# Patient Record
Sex: MALE | Race: BLACK OR AFRICAN AMERICAN
[De-identification: names, ages, dates, MRNs, and addresses within clinical notes are randomized per-mention and may not be internally consistent; named-entity substitution may affect disease eponyms.]

---

## 2017-05-06 ENCOUNTER — HOSPITAL ENCOUNTER (INPATIENT)
Dept: HOSPITAL 8 - ED | Age: 77
LOS: 1 days | Discharge: HOME | DRG: 682 | End: 2017-05-07
Attending: INTERNAL MEDICINE | Admitting: INTERNAL MEDICINE
Payer: COMMERCIAL

## 2017-05-06 VITALS — WEIGHT: 196.87 LBS | HEIGHT: 68 IN | BODY MASS INDEX: 29.84 KG/M2

## 2017-05-06 VITALS — DIASTOLIC BLOOD PRESSURE: 81 MMHG | SYSTOLIC BLOOD PRESSURE: 130 MMHG

## 2017-05-06 VITALS — SYSTOLIC BLOOD PRESSURE: 110 MMHG | DIASTOLIC BLOOD PRESSURE: 68 MMHG

## 2017-05-06 DIAGNOSIS — E87.5: ICD-10-CM

## 2017-05-06 DIAGNOSIS — N18.4: ICD-10-CM

## 2017-05-06 DIAGNOSIS — I12.9: ICD-10-CM

## 2017-05-06 DIAGNOSIS — Z83.3: ICD-10-CM

## 2017-05-06 DIAGNOSIS — Z59.0: ICD-10-CM

## 2017-05-06 DIAGNOSIS — E11.22: ICD-10-CM

## 2017-05-06 DIAGNOSIS — J18.9: ICD-10-CM

## 2017-05-06 DIAGNOSIS — Z79.899: ICD-10-CM

## 2017-05-06 DIAGNOSIS — N17.0: Primary | ICD-10-CM

## 2017-05-06 DIAGNOSIS — Z88.0: ICD-10-CM

## 2017-05-06 DIAGNOSIS — F10.10: ICD-10-CM

## 2017-05-06 DIAGNOSIS — Z79.82: ICD-10-CM

## 2017-05-06 DIAGNOSIS — K92.2: ICD-10-CM

## 2017-05-06 LAB
AST SERPL-CCNC: 28 U/L (ref 15–37)
BUN SERPL-MCNC: 36 MG/DL (ref 7–18)
BUN SERPL-MCNC: 37 MG/DL (ref 7–18)
PATH.CAST-FLAG: (no result)
SPERM-FLAG: (no result)
SRC-FLAG: (no result)
XTAL-FLAG: (no result)
YLC-FLAG: (no result)

## 2017-05-06 PROCEDURE — 85018 HEMOGLOBIN: CPT

## 2017-05-06 PROCEDURE — 36415 COLL VENOUS BLD VENIPUNCTURE: CPT

## 2017-05-06 PROCEDURE — 96375 TX/PRO/DX INJ NEW DRUG ADDON: CPT

## 2017-05-06 PROCEDURE — 83735 ASSAY OF MAGNESIUM: CPT

## 2017-05-06 PROCEDURE — 82140 ASSAY OF AMMONIA: CPT

## 2017-05-06 PROCEDURE — 84132 ASSAY OF SERUM POTASSIUM: CPT

## 2017-05-06 PROCEDURE — 96374 THER/PROPH/DIAG INJ IV PUSH: CPT

## 2017-05-06 PROCEDURE — 84100 ASSAY OF PHOSPHORUS: CPT

## 2017-05-06 PROCEDURE — 81001 URINALYSIS AUTO W/SCOPE: CPT

## 2017-05-06 PROCEDURE — 80061 LIPID PANEL: CPT

## 2017-05-06 PROCEDURE — 80048 BASIC METABOLIC PNL TOTAL CA: CPT

## 2017-05-06 PROCEDURE — 93005 ELECTROCARDIOGRAM TRACING: CPT

## 2017-05-06 PROCEDURE — 85025 COMPLETE CBC W/AUTO DIFF WBC: CPT

## 2017-05-06 PROCEDURE — 83036 HEMOGLOBIN GLYCOSYLATED A1C: CPT

## 2017-05-06 PROCEDURE — 84443 ASSAY THYROID STIM HORMONE: CPT

## 2017-05-06 PROCEDURE — 87324 CLOSTRIDIUM AG IA: CPT

## 2017-05-06 PROCEDURE — 80053 COMPREHEN METABOLIC PANEL: CPT

## 2017-05-06 PROCEDURE — 90732 PPSV23 VACC 2 YRS+ SUBQ/IM: CPT

## 2017-05-06 PROCEDURE — 82962 GLUCOSE BLOOD TEST: CPT

## 2017-05-06 PROCEDURE — 83605 ASSAY OF LACTIC ACID: CPT

## 2017-05-06 PROCEDURE — C9113 INJ PANTOPRAZOLE SODIUM, VIA: HCPCS

## 2017-05-06 PROCEDURE — 87040 BLOOD CULTURE FOR BACTERIA: CPT

## 2017-05-06 PROCEDURE — 96361 HYDRATE IV INFUSION ADD-ON: CPT

## 2017-05-06 PROCEDURE — 85014 HEMATOCRIT: CPT

## 2017-05-06 PROCEDURE — 71010: CPT

## 2017-05-06 PROCEDURE — 83690 ASSAY OF LIPASE: CPT

## 2017-05-06 PROCEDURE — 85610 PROTHROMBIN TIME: CPT

## 2017-05-06 PROCEDURE — 84145 PROCALCITONIN (PCT): CPT

## 2017-05-06 PROCEDURE — 85730 THROMBOPLASTIN TIME PARTIAL: CPT

## 2017-05-06 RX ADMIN — CEFTRIAXONE SCH MLS/HR: 1 INJECTION, SOLUTION INTRAVENOUS at 16:47

## 2017-05-06 RX ADMIN — PANTOPRAZOLE SODIUM SCH MG: 40 INJECTION, POWDER, FOR SOLUTION INTRAVENOUS at 22:48

## 2017-05-06 RX ADMIN — AZITHROMYCIN SCH MG: 500 TABLET, FILM COATED ORAL at 18:22

## 2017-05-06 RX ADMIN — GUAIFENESIN SCH MG: 200 TABLET ORAL at 16:47

## 2017-05-06 RX ADMIN — PANTOPRAZOLE SODIUM SCH MG: 40 INJECTION, POWDER, FOR SOLUTION INTRAVENOUS at 16:47

## 2017-05-06 RX ADMIN — INSULIN HUMAN SCH UNITS: 100 INJECTION, SOLUTION PARENTERAL at 16:00

## 2017-05-06 RX ADMIN — GUAIFENESIN SCH MG: 200 TABLET ORAL at 22:48

## 2017-05-06 RX ADMIN — INSULIN HUMAN SCH UNITS: 100 INJECTION, SOLUTION PARENTERAL at 21:00

## 2017-05-06 SDOH — ECONOMIC STABILITY - HOUSING INSECURITY: HOMELESSNESS: Z59.0

## 2017-05-07 VITALS — DIASTOLIC BLOOD PRESSURE: 78 MMHG | SYSTOLIC BLOOD PRESSURE: 129 MMHG

## 2017-05-07 VITALS — SYSTOLIC BLOOD PRESSURE: 118 MMHG | DIASTOLIC BLOOD PRESSURE: 71 MMHG

## 2017-05-07 VITALS — DIASTOLIC BLOOD PRESSURE: 66 MMHG | SYSTOLIC BLOOD PRESSURE: 109 MMHG

## 2017-05-07 LAB — BUN SERPL-MCNC: 32 MG/DL (ref 7–18)

## 2017-05-07 RX ADMIN — GUAIFENESIN SCH MG: 200 TABLET ORAL at 05:50

## 2017-05-07 RX ADMIN — CEFTRIAXONE SCH MLS/HR: 1 INJECTION, SOLUTION INTRAVENOUS at 13:30

## 2017-05-07 RX ADMIN — INSULIN HUMAN SCH UNITS: 100 INJECTION, SOLUTION PARENTERAL at 16:00

## 2017-05-07 RX ADMIN — INSULIN HUMAN SCH UNITS: 100 INJECTION, SOLUTION PARENTERAL at 11:00

## 2017-05-07 RX ADMIN — INSULIN HUMAN SCH UNITS: 100 INJECTION, SOLUTION PARENTERAL at 07:00

## 2017-05-07 RX ADMIN — PANTOPRAZOLE SODIUM SCH MG: 40 INJECTION, POWDER, FOR SOLUTION INTRAVENOUS at 10:52

## 2017-05-07 RX ADMIN — AZITHROMYCIN SCH MG: 500 TABLET, FILM COATED ORAL at 10:53

## 2017-05-07 RX ADMIN — GUAIFENESIN SCH MG: 200 TABLET ORAL at 16:02

## 2020-11-07 ENCOUNTER — APPOINTMENT (OUTPATIENT)
Dept: RADIOLOGY | Facility: MEDICAL CENTER | Age: 80
End: 2020-11-07
Attending: EMERGENCY MEDICINE
Payer: MEDICARE

## 2020-11-07 ENCOUNTER — HOSPITAL ENCOUNTER (EMERGENCY)
Facility: MEDICAL CENTER | Age: 80
End: 2020-11-07
Attending: EMERGENCY MEDICINE
Payer: MEDICARE

## 2020-11-07 VITALS
SYSTOLIC BLOOD PRESSURE: 198 MMHG | BODY MASS INDEX: 27.34 KG/M2 | HEIGHT: 67 IN | RESPIRATION RATE: 14 BRPM | HEART RATE: 78 BPM | WEIGHT: 174.16 LBS | DIASTOLIC BLOOD PRESSURE: 90 MMHG | OXYGEN SATURATION: 95 % | TEMPERATURE: 98.1 F

## 2020-11-07 DIAGNOSIS — K59.00 CONSTIPATION, UNSPECIFIED CONSTIPATION TYPE: ICD-10-CM

## 2020-11-07 DIAGNOSIS — I10 HYPERTENSION, UNSPECIFIED TYPE: ICD-10-CM

## 2020-11-07 DIAGNOSIS — R11.2 NAUSEA AND VOMITING, INTRACTABILITY OF VOMITING NOT SPECIFIED, UNSPECIFIED VOMITING TYPE: ICD-10-CM

## 2020-11-07 LAB
ALBUMIN SERPL BCP-MCNC: 4.6 G/DL (ref 3.2–4.9)
ALBUMIN/GLOB SERPL: 1.2 G/DL
ALP SERPL-CCNC: 85 U/L (ref 30–99)
ALT SERPL-CCNC: 11 U/L (ref 2–50)
ANION GAP SERPL CALC-SCNC: 15 MMOL/L (ref 7–16)
AST SERPL-CCNC: 23 U/L (ref 12–45)
BASOPHILS # BLD AUTO: 1 % (ref 0–1.8)
BASOPHILS # BLD: 0.05 K/UL (ref 0–0.12)
BILIRUB SERPL-MCNC: 0.4 MG/DL (ref 0.1–1.5)
BUN SERPL-MCNC: 22 MG/DL (ref 8–22)
CALCIUM SERPL-MCNC: 9.7 MG/DL (ref 8.5–10.5)
CHLORIDE SERPL-SCNC: 97 MMOL/L (ref 96–112)
CO2 SERPL-SCNC: 24 MMOL/L (ref 20–33)
CREAT SERPL-MCNC: 1.76 MG/DL (ref 0.5–1.4)
EOSINOPHIL # BLD AUTO: 0.36 K/UL (ref 0–0.51)
EOSINOPHIL NFR BLD: 7.3 % (ref 0–6.9)
ERYTHROCYTE [DISTWIDTH] IN BLOOD BY AUTOMATED COUNT: 49.9 FL (ref 35.9–50)
GLOBULIN SER CALC-MCNC: 3.9 G/DL (ref 1.9–3.5)
GLUCOSE SERPL-MCNC: 81 MG/DL (ref 65–99)
HCT VFR BLD AUTO: 45 % (ref 42–52)
HGB BLD-MCNC: 14.7 G/DL (ref 14–18)
IMM GRANULOCYTES # BLD AUTO: 0.01 K/UL (ref 0–0.11)
IMM GRANULOCYTES NFR BLD AUTO: 0.2 % (ref 0–0.9)
LYMPHOCYTES # BLD AUTO: 1.67 K/UL (ref 1–4.8)
LYMPHOCYTES NFR BLD: 33.7 % (ref 22–41)
MCH RBC QN AUTO: 32 PG (ref 27–33)
MCHC RBC AUTO-ENTMCNC: 32.7 G/DL (ref 33.7–35.3)
MCV RBC AUTO: 98 FL (ref 81.4–97.8)
MONOCYTES # BLD AUTO: 0.42 K/UL (ref 0–0.85)
MONOCYTES NFR BLD AUTO: 8.5 % (ref 0–13.4)
NEUTROPHILS # BLD AUTO: 2.45 K/UL (ref 1.82–7.42)
NEUTROPHILS NFR BLD: 49.3 % (ref 44–72)
NRBC # BLD AUTO: 0 K/UL
NRBC BLD-RTO: 0 /100 WBC
PLATELET # BLD AUTO: 249 K/UL (ref 164–446)
PMV BLD AUTO: 10.6 FL (ref 9–12.9)
POTASSIUM SERPL-SCNC: 4.7 MMOL/L (ref 3.6–5.5)
PROT SERPL-MCNC: 8.5 G/DL (ref 6–8.2)
RBC # BLD AUTO: 4.59 M/UL (ref 4.7–6.1)
SODIUM SERPL-SCNC: 136 MMOL/L (ref 135–145)
WBC # BLD AUTO: 5 K/UL (ref 4.8–10.8)

## 2020-11-07 PROCEDURE — 700111 HCHG RX REV CODE 636 W/ 250 OVERRIDE (IP): Performed by: EMERGENCY MEDICINE

## 2020-11-07 PROCEDURE — 96374 THER/PROPH/DIAG INJ IV PUSH: CPT

## 2020-11-07 PROCEDURE — A9270 NON-COVERED ITEM OR SERVICE: HCPCS | Performed by: EMERGENCY MEDICINE

## 2020-11-07 PROCEDURE — 700102 HCHG RX REV CODE 250 W/ 637 OVERRIDE(OP): Performed by: EMERGENCY MEDICINE

## 2020-11-07 PROCEDURE — 99284 EMERGENCY DEPT VISIT MOD MDM: CPT

## 2020-11-07 PROCEDURE — 74019 RADEX ABDOMEN 2 VIEWS: CPT

## 2020-11-07 PROCEDURE — 80053 COMPREHEN METABOLIC PANEL: CPT

## 2020-11-07 PROCEDURE — 85025 COMPLETE CBC W/AUTO DIFF WBC: CPT

## 2020-11-07 RX ORDER — AMLODIPINE BESYLATE 5 MG/1
5 TABLET ORAL ONCE
Status: COMPLETED | OUTPATIENT
Start: 2020-11-07 | End: 2020-11-07

## 2020-11-07 RX ORDER — AMLODIPINE BESYLATE 5 MG/1
5 TABLET ORAL DAILY
Qty: 30 TAB | Refills: 0 | Status: SHIPPED | OUTPATIENT
Start: 2020-11-07 | End: 2021-06-20 | Stop reason: SDUPTHER

## 2020-11-07 RX ORDER — DOCUSATE SODIUM 100 MG/1
100 CAPSULE, LIQUID FILLED ORAL ONCE
Status: COMPLETED | OUTPATIENT
Start: 2020-11-07 | End: 2020-11-07

## 2020-11-07 RX ORDER — ONDANSETRON 2 MG/ML
4 INJECTION INTRAMUSCULAR; INTRAVENOUS ONCE
Status: COMPLETED | OUTPATIENT
Start: 2020-11-07 | End: 2020-11-07

## 2020-11-07 RX ORDER — ONDANSETRON 4 MG/1
4 TABLET, ORALLY DISINTEGRATING ORAL EVERY 6 HOURS PRN
Qty: 16 TAB | Refills: 0 | Status: SHIPPED | OUTPATIENT
Start: 2020-11-07 | End: 2020-11-11

## 2020-11-07 RX ADMIN — AMLODIPINE BESYLATE 5 MG: 5 TABLET ORAL at 04:38

## 2020-11-07 RX ADMIN — DOCUSATE SODIUM 100 MG: 100 CAPSULE ORAL at 04:37

## 2020-11-07 RX ADMIN — ONDANSETRON 4 MG: 2 INJECTION INTRAMUSCULAR; INTRAVENOUS at 03:47

## 2020-11-07 SDOH — HEALTH STABILITY: MENTAL HEALTH: HOW OFTEN DO YOU HAVE A DRINK CONTAINING ALCOHOL?: NEVER

## 2020-11-07 ASSESSMENT — ENCOUNTER SYMPTOMS
CHILLS: 0
CONSTIPATION: 1
NAUSEA: 1
FEVER: 0
VOMITING: 1
DIARRHEA: 0
ABDOMINAL PAIN: 0
SHORTNESS OF BREATH: 0
COUGH: 0
SORE THROAT: 0
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
## 2020-12-19 ENCOUNTER — HOSPITAL ENCOUNTER (EMERGENCY)
Dept: HOSPITAL 8 - ED | Age: 80
Discharge: HOME | End: 2020-12-19
Payer: SELF-PAY

## 2020-12-19 VITALS — SYSTOLIC BLOOD PRESSURE: 163 MMHG | DIASTOLIC BLOOD PRESSURE: 85 MMHG

## 2020-12-19 VITALS — HEIGHT: 69 IN | WEIGHT: 154.32 LBS | BODY MASS INDEX: 22.86 KG/M2

## 2020-12-19 DIAGNOSIS — E11.40: Primary | ICD-10-CM

## 2020-12-19 DIAGNOSIS — M79.671: ICD-10-CM

## 2020-12-19 DIAGNOSIS — M79.672: ICD-10-CM

## 2020-12-19 PROCEDURE — 99285 EMERGENCY DEPT VISIT HI MDM: CPT

## 2020-12-19 NOTE — NUR
1 TRIAGE RN NOTE: NO ANSWER WHEN CALLED FROM [employer] 

-------------------------------------------------------------------------------

Addendum: 12/19/20 at 1436 by JW

-------------------------------------------------------------------------------

 TRIAGE RN NOTE: NO ANSWER WHEN CALLED FROM [facility] 1

## 2020-12-19 NOTE — NUR
CHARGE RN: PT TO ROOM FROM [facility]

-------------------------------------------------------------------------------

Addendum: 12/19/20 at 1716 by ADI

-------------------------------------------------------------------------------

PT TO ROOM VIA W/C

## 2020-12-19 NOTE — NUR
PT HAS PLAN TO LEAVE HOSPITAL AND WALK TO [facility]. PT OFFERED [facility] VOUCHER AND 
PT HAS REFUSED MULTIPLE TIMES. PT STATES HE WILL GET A BUS PASS TOMORROW TO 
HEAD BACK TO [place]. PT GIVEN DC INSTRUCTIONS AND VERBALIZES UNDERSTANDING. 
PT HAS SIGNED NARCOTIC AGREEMENT AND VERBALIZES UNDERSTANDING OF THAT AS WELL. 
PT STEADY ON FEET

## 2020-12-19 NOTE — NUR
PT C/O BILATERAL LEG PAIN FOR 3 DAYS. PT STATES IT IS HIS NEUROPATHY AND HE HAS 
FELT THIS BEFORE. PT ALSO STATES HE HAS NOT HAD AN APPETITE FOR 3 DAYS. PT IS 
ALSO CONCERNED ABOUT HOW HE IS GOING TO GET BACK TO [place] AND IS ASKING ABOUT 
HOW TO GET A RIDE BACK.

## 2021-01-02 ENCOUNTER — HOSPITAL ENCOUNTER (EMERGENCY)
Dept: HOSPITAL 8 - ED | Age: 81
Discharge: HOME | End: 2021-01-02
Payer: SELF-PAY

## 2021-01-02 VITALS — WEIGHT: 180.78 LBS | HEIGHT: 68 IN | BODY MASS INDEX: 27.4 KG/M2

## 2021-01-02 VITALS — SYSTOLIC BLOOD PRESSURE: 142 MMHG | DIASTOLIC BLOOD PRESSURE: 73 MMHG
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
## 2021-01-02 DIAGNOSIS — T78.49XA: ICD-10-CM

## 2021-01-02 DIAGNOSIS — Z87.891: ICD-10-CM
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
## 2021-01-02 DIAGNOSIS — L29.9: ICD-10-CM

## 2021-01-02 DIAGNOSIS — S40.862A: Primary | ICD-10-CM
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
## 2021-01-02 DIAGNOSIS — E11.9: ICD-10-CM

## 2021-01-02 DIAGNOSIS — I10: ICD-10-CM

## 2021-01-02 DIAGNOSIS — S40.861A: ICD-10-CM
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
## 2021-01-02 PROCEDURE — 99283 EMERGENCY DEPT VISIT LOW MDM: CPT
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
## 2021-01-04 ENCOUNTER — HOSPITAL ENCOUNTER (EMERGENCY)
Facility: MEDICAL CENTER | Age: 81
End: 2021-01-04
Attending: EMERGENCY MEDICINE
Payer: MEDICARE

## 2021-01-04 VITALS
TEMPERATURE: 98.3 F | BODY MASS INDEX: 26.06 KG/M2 | WEIGHT: 171.96 LBS | SYSTOLIC BLOOD PRESSURE: 137 MMHG | DIASTOLIC BLOOD PRESSURE: 85 MMHG | HEIGHT: 68 IN | OXYGEN SATURATION: 97 % | RESPIRATION RATE: 16 BRPM | HEART RATE: 88 BPM

## 2021-01-04 DIAGNOSIS — R21 RASH: ICD-10-CM

## 2021-01-04 PROCEDURE — A9270 NON-COVERED ITEM OR SERVICE: HCPCS | Performed by: EMERGENCY MEDICINE

## 2021-01-04 PROCEDURE — 99283 EMERGENCY DEPT VISIT LOW MDM: CPT

## 2021-01-04 PROCEDURE — 700102 HCHG RX REV CODE 250 W/ 637 OVERRIDE(OP): Performed by: EMERGENCY MEDICINE

## 2021-01-04 RX ORDER — DIPHENHYDRAMINE HCL 25 MG
25 TABLET ORAL ONCE
Status: COMPLETED | OUTPATIENT
Start: 2021-01-04 | End: 2021-01-04

## 2021-01-04 RX ADMIN — DIPHENHYDRAMINE HYDROCHLORIDE 25 MG: 25 TABLET ORAL at 15:58

## 2021-01-04 ASSESSMENT — FIBROSIS 4 INDEX: FIB4 SCORE: 2.17

## 2021-01-04 NOTE — ED TRIAGE NOTES
"Chief Complaint   Patient presents with   • Itching     \"all over my belly to my butt crack\"     Pt ambulatory to triage for above complaint. Pt isn't sure if he has a rash but just that he is very itchy. Pt resides in the shelter. When asked if he noticed any bugs he denies any. Pt reports he stopped taking his flomax in case that is what's causing the itching.     /87   Pulse 90   Temp 36.9 °C (98.4 °F) (Oral)   Resp 16   Ht 1.727 m (5' 8\")   Wt 78 kg (171 lb 15.3 oz)   SpO2 95%   BMI 26.15 kg/m²     "

## 2021-01-05 NOTE — ED PROVIDER NOTES
"ED Provider Note    CHIEF COMPLAINT  Chief Complaint   Patient presents with   • Itching     \"all over my belly to my butt crack\"       HPI  Ángel Jerry is a 80 y.o. male who presents the patient presents to the emergency department complaining of a rash.  He states it is itchy and uncomfortable.  He states is primarily over his upper back and shoulders.  Told the nursing staff that the rash was all over his legs and belly.  This area he states have resolved now with itching over his shoulder.    He was recently at [place] and started on Zofran and Flomax.  He is concerned this might be causing a rash.  He denies any new soaps or lotions or other contacts.  Denies any other acute concerns or complaints.  Symptoms are bilateral and intensely pruritic.    Denies STD exposure or sexual activity.    REVIEW OF SYSTEMS  See HPI for further details. All other systems are negative.    PAST MEDICAL HISTORY  Past Medical History:   Diagnosis Date   • Diabetes (HCC)    • Hypertension        FAMILY HISTORY  History reviewed. No pertinent family history.    SOCIAL HISTORY  Social History     Socioeconomic History   • Marital status: Single     Spouse name: Not on file   • Number of children: Not on file   • Years of education: Not on file   • Highest education level: Not on file   Occupational History   • Not on file   Social Needs   • Financial resource strain: Not on file   • Food insecurity     Worry: Not on file     Inability: Not on file   • Transportation needs     Medical: Not on file     Non-medical: Not on file   Tobacco Use   • Smoking status: Never Smoker   • Smokeless tobacco: Never Used   Substance and Sexual Activity   • Alcohol use: Never     Frequency: Never   • Drug use: Never   • Sexual activity: Not on file   Lifestyle   • Physical activity     Days per week: Not on file     Minutes per session: Not on file   • Stress: Not on file   Relationships   • Social connections     Talks on phone: Not on file     " "Gets together: Not on file     Attends Sabianism service: Not on file     Active member of club or organization: Not on file     Attends meetings of clubs or organizations: Not on file     Relationship status: Not on file   • Intimate partner violence     Fear of current or ex partner: Not on file     Emotionally abused: Not on file     Physically abused: Not on file     Forced sexual activity: Not on file   Other Topics Concern   • Not on file   Social History Narrative   • Not on file       SURGICAL HISTORY  History reviewed. No pertinent surgical history.    CURRENT MEDICATIONS  Home Medications     Reviewed by Florencia Galarza R.N. (Registered Nurse) on 01/04/21 at 1445  Med List Status: Partial   Medication Last Dose Status   amLODIPine (NORVASC) 5 MG Tab  Active   metFORMIN (GLUCOPHAGE) 500 MG Tab  Active                ALLERGIES  Allergies   Allergen Reactions   • Penicillins        PHYSICAL EXAM  VITAL SIGNS: /87   Pulse 90   Temp 36.9 °C (98.4 °F) (Oral)   Resp 16   Ht 1.727 m (5' 8\")   Wt 78 kg (171 lb 15.3 oz)   SpO2 95%   BMI 26.15 kg/m²    Constitutional: Awake nontoxic no acute distress  HENT: Normocephalic, Atraumatic, Bilateral external ears normal,  Eyes: PERRL, EOMI, Conjunctiva normal, No discharge.   Neck: Normal range of motion,  Cardiovascular: Normal heart rate, Normal rhythm, No murmurs,  Thorax & Lungs: Normal breath sounds, No respiratory distress,  Abdomen: Bowel sounds normal, Soft, No tenderness,   Skin: Warm, Dry, no erythema.  He has raised erythematous lesions on the back of his neck and on the shoulders bilaterally.  Extends slightly down his back.  Some of these lesions are confluent.  There is no petechiae, there is no vesicles there is no purpura    There is no lesions on his lower back or buttocks or palms.  Musculoskeletal: Good range of motion in all major joints.   Neurologic: Alert, No focal deficits noted.   Psychiatric: Affect anxious      COURSE & MEDICAL " DECISION MAKING  Pertinent Labs & Imaging studies reviewed. (See chart for details)    The patient has a rash that looks to be like an irritant dermatitis or urticaria.  This is not shingles this is not infectious this is not a bacterial or viral illness or folliculitis.  Does not appear to be a drug eruption.    The patient does not have a history of suggest this is a syphilitic type rash.  There is no exposure does not have the characteristic appearance.    Patient was given oral Benadryl and observed.  The itching has improved.  Despite his age he tolerated 25 mg tablets of Benadryl fairly well.  He has medicines that recently prescribed to the hospital.  I filled a bottle of Benadryl.  Is not taking this because he was concerned that one of the medications he was prescribed is causing this rash.  He does not appear to be scabietic in nature.  He does not have the right pattern.    At this point this does not appear to be of a life-threatening rash or a rash that is associated with systemic pathology.  He does have chronic renal insufficiency but it does not appear to be a uremic rash.  Is not febrile ill or toxic.  I do not have any blood tests or further work-up indicated    He is discharged to go back to the shelter he is advised that the Benadryl he has.  Can return for any new symptoms or complaints.  Questions answered agreeable to plan and discharged in good condition.    He is referred to the [facility].    [address] [address] [phone]  Schedule an appointment as soon as possible for a visit in 2 days          FINAL IMPRESSION  1. Rash         2.   3.         Electronically signed by: Luis Antonio Duran M.D., 1/4/2021 4:31 PM

## 2021-01-05 NOTE — DISCHARGE PLANNING
SW assisted Pt. With a cab voucher to get back to shelter.   Cab voucher [phone].    Cab company was contacted and it will be 60-90 minutes.     SW updated RN to notify Pt it would be a long wait.

## 2021-01-05 NOTE — DISCHARGE INSTRUCTIONS
Please take the medicine you were prescribed, diphenhydramine.  We will identified this bottle for you.    Take 1 tablet every 8 hours.    Return for worsening rash, redness, fever, or other concerns.

## 2021-01-05 NOTE — ED NOTES
Pt given discharge instructions/explained to pt how to take own medication and provided instruction/ home care instructions explained, pt verbalized understanding of instructions given/pt understands the importance of follow up, pt ambulatory to [facility] called for pt.

## 2021-01-08 ENCOUNTER — HOSPITAL ENCOUNTER (EMERGENCY)
Facility: MEDICAL CENTER | Age: 81
End: 2021-01-08
Attending: EMERGENCY MEDICINE
Payer: MEDICARE

## 2021-01-08 VITALS
BODY MASS INDEX: 25.5 KG/M2 | WEIGHT: 172.18 LBS | TEMPERATURE: 97.3 F | DIASTOLIC BLOOD PRESSURE: 71 MMHG | HEART RATE: 76 BPM | HEIGHT: 69 IN | SYSTOLIC BLOOD PRESSURE: 130 MMHG | RESPIRATION RATE: 16 BRPM | OXYGEN SATURATION: 97 %

## 2021-01-08 DIAGNOSIS — L29.9 PRURITUS: ICD-10-CM

## 2021-01-08 DIAGNOSIS — R21 RASH: ICD-10-CM

## 2021-01-08 PROCEDURE — A9270 NON-COVERED ITEM OR SERVICE: HCPCS | Performed by: EMERGENCY MEDICINE

## 2021-01-08 PROCEDURE — 99283 EMERGENCY DEPT VISIT LOW MDM: CPT

## 2021-01-08 PROCEDURE — 700102 HCHG RX REV CODE 250 W/ 637 OVERRIDE(OP): Performed by: EMERGENCY MEDICINE

## 2021-01-08 RX ORDER — HYDROXYZINE HYDROCHLORIDE 25 MG/1
25 TABLET, FILM COATED ORAL ONCE
Status: COMPLETED | OUTPATIENT
Start: 2021-01-08 | End: 2021-01-08

## 2021-01-08 RX ORDER — HYDROXYZINE HYDROCHLORIDE 25 MG/1
25 TABLET, FILM COATED ORAL 3 TIMES DAILY PRN
Qty: 10 TAB | Refills: 0 | Status: SHIPPED | OUTPATIENT
Start: 2021-01-08 | End: 2021-04-05 | Stop reason: SDUPTHER

## 2021-01-08 RX ADMIN — HYDROXYZINE HYDROCHLORIDE 25 MG: 25 TABLET, FILM COATED ORAL at 10:31

## 2021-01-08 ASSESSMENT — FIBROSIS 4 INDEX: FIB4 SCORE: 2.17

## 2021-01-08 NOTE — DISCHARGE INSTRUCTIONS
Follow-up with primary care next week for reevaluation, to establish care, for medication management close blood pressure monitoring.    Try hydroxyzine 3 times daily (every 8 hours) as needed for itching.  Do NOT take Benadryl while also taking this medication.    Consider that this may be bedbugs.  As available, wash all bedding, close in scalding hot water.  Keep sleeping area clean.    Return to the emergency department for intractable itching, worsening rash, swelling, fever, difficulty breathing or swallowing or other new concerns.

## 2021-01-08 NOTE — DISCHARGE PLANNING
note:  Tech asked CM if there are options for pt other than going back to the shelter. Information given for [facility] which has rooms for rent for $400 a month however, pt would need to qualify. Discussed with [facility] also. SW has offered to help as needed. Tech will give team updates.

## 2021-01-08 NOTE — ED TRIAGE NOTES
".  Chief Complaint   Patient presents with   • Itchy     \"all over\"   • Weakness     generalized     ./96   Pulse (!) 108   Temp 36.3 °C (97.3 °F) (Temporal)   Resp 18   Ht 1.74 m (5' 8.5\")   Wt 78.1 kg (172 lb 2.9 oz)   SpO2 94%   BMI 25.80 kg/m²     Ambulatory to triage with above complaints, seen here 1/4 for same, educated on triage process, placed in lobby, told to inform staff of any changes in condition.    "

## 2021-01-09 NOTE — ED PROVIDER NOTES
"ED Provider Note    CHIEF COMPLAINT  Chief Complaint   Patient presents with   • Itchy     \"all over\"   • Weakness     generalized       HPI  Ángel Jerry is a 80 y.o. male who presents to the emergency department through triage for rash and itching.  Patient describes recurrent rash intermittent for a couple of months.  Seen and treated with Benadryl previously but states this has been ineffective.  Denies any other new medications, supplements or vitamins.  Denies new soaps, lotions or detergents.  Denies new food, fruits or juices.  However, patient is homeless and staying at the shelter.    Patient noticed recurrent rash to his upper back and upper extremities 2 days ago.  Pruritus is intractable.  Unable to sleep last night.  Denies fever or chills.  Denies blisters, vesicles, purulent drainage.    REVIEW OF SYSTEMS  See HPI for further details. All other systems are negative.     PAST MEDICAL HISTORY   has a past medical history of Diabetes (HCC) and Hypertension.    SOCIAL HISTORY  Social History     Tobacco Use   • Smoking status: Never Smoker   • Smokeless tobacco: Never Used   Substance and Sexual Activity   • Alcohol use: Never     Frequency: Never   • Drug use: Never   • Sexual activity: Not on file       SURGICAL HISTORY  patient denies any surgical history    CURRENT MEDICATIONS  Home Medications     Reviewed by Yaz Roberts R.N. (Registered Nurse) on 01/08/21 at 0849  Med List Status: Partial   Medication Last Dose Status   amLODIPine (NORVASC) 5 MG Tab  Active   metFORMIN (GLUCOPHAGE) 500 MG Tab  Active                ALLERGIES  Allergies   Allergen Reactions   • Penicillins        PHYSICAL EXAM  VITAL SIGNS: /71   Pulse 76   Temp 36.3 °C (97.3 °F) (Temporal)   Resp 16   Ht 1.74 m (5' 8.5\")   Wt 78.1 kg (172 lb 2.9 oz)   SpO2 97%   BMI 25.80 kg/m²   Pulse ox interpretation: I interpret this pulse ox as normal.  Constitutional: Alert in no apparent distress.  HENT: Normocephalic, " atraumatic. Bilateral external ears normal, Nose normal. Moist mucous membranes.    Eyes: Pupils are equal and reactive, Conjunctiva normal.   Neck: Normal range of motion  Lymphatic: No lymphadenopathy noted.   Cardiovascular: Normal peripheral perfusion.  Thorax & Lungs: Nonlabored respirations.  Skin: Warm, Dry.  Macular papular rash across upper back, left upper extremity.  No vesicles, induration, fluctuance or crepitus.  Musculoskeletal: Good range of motion in all major joints.   Neurologic: Alert and oriented x4.  Ambulates independently.  Psychiatric: Affect normal, Judgment normal, Mood normal.       COURSE & MEDICAL DECISION MAKING  Nursing notes and vital signs were reviewed. (See chart for details)  The patients records were reviewed, history was obtained from the patient;     ED evaluation for recurrent rash is unrevealing.  This does not appear to be infectious, this is not zoster.  There is no abscess or cellulitis.  Cannot exclude contact dermatitis versus bedbugs.  No clinical evidence for scabies.  Given hydroxyzine without much relief but will continue this instead of Benadryl.  Topical Benadryl encouraged as well.  Recommendations given for bedbugs although this is difficult as he is in a shelter.  Other placement opportunities have been discussed with .    Patient is stable for discharge at this time, anticipatory guidance provided, hydroxyzine for pruritus, close follow-up is encouraged, and strict ED return instructions have been detailed. Patient is agreeable to the disposition and plan.    Patient's blood pressure was elevated in the emergency department, and has been referred to primary care for close monitoring.    FINAL IMPRESSION  (R21) Rash  (L29.9) Pruritus      Electronically signed by: Marixa Peterson D.O., 1/8/2021 5:07 PM      This dictation was created using voice recognition software. The accuracy of the dictation is limited to the abilities of the software. I  expect there may be some errors of grammar and possibly content. The nursing notes were reviewed and certain aspects of this information were incorporated into this note.

## 2021-01-12 DIAGNOSIS — Z23 NEED FOR VACCINATION: ICD-10-CM

## 2021-02-18 ENCOUNTER — HOSPITAL ENCOUNTER (EMERGENCY)
Dept: HOSPITAL 8 - ED | Age: 81
Discharge: HOME | End: 2021-02-18
Payer: MEDICARE

## 2021-02-18 VITALS — BODY MASS INDEX: 26.12 KG/M2 | WEIGHT: 176.37 LBS | HEIGHT: 69 IN

## 2021-02-18 VITALS — DIASTOLIC BLOOD PRESSURE: 85 MMHG | SYSTOLIC BLOOD PRESSURE: 183 MMHG

## 2021-02-18 DIAGNOSIS — R21: ICD-10-CM

## 2021-02-18 DIAGNOSIS — I10: ICD-10-CM

## 2021-02-18 DIAGNOSIS — E11.9: ICD-10-CM

## 2021-02-18 DIAGNOSIS — Z88.0: ICD-10-CM

## 2021-02-18 DIAGNOSIS — L50.9: Primary | ICD-10-CM

## 2021-02-18 PROCEDURE — 99283 EMERGENCY DEPT VISIT LOW MDM: CPT

## 2021-02-18 NOTE — NUR
PT GIVEN DISCHARGE INSTRUCTIONS AND EDUCATION. PT VERBALIZED UNDERSTANDING. PT 
ALSO PROVIDED A BUS VOUCHER TO GO BACK [facility].

## 2021-02-18 NOTE — NUR
PT BIBA FROM [facility]. PER EMS PT HAS HIVES ON ALL EXTREMITIES. NO 
DIFFICULTY BREATHING, NO SWELLING. PT RECIEVED 50MG OF BENADRYL PO FROM EMS. PT 
RESTING IN GURNEY, MONITORING IN PLACE, NADN AT THIS TIME, PER PT NO NEEDS, 
WCTM.

## 2021-02-20 ENCOUNTER — HOSPITAL ENCOUNTER (EMERGENCY)
Dept: HOSPITAL 8 - ED | Age: 81
Discharge: HOME | End: 2021-02-20
Payer: MEDICARE

## 2021-02-20 VITALS — WEIGHT: 178.35 LBS | HEIGHT: 69 IN | BODY MASS INDEX: 26.42 KG/M2

## 2021-02-20 VITALS — DIASTOLIC BLOOD PRESSURE: 85 MMHG | SYSTOLIC BLOOD PRESSURE: 155 MMHG

## 2021-02-20 DIAGNOSIS — I12.9: ICD-10-CM

## 2021-02-20 DIAGNOSIS — E11.22: ICD-10-CM

## 2021-02-20 DIAGNOSIS — N18.30: ICD-10-CM

## 2021-02-20 DIAGNOSIS — L20.84: Primary | ICD-10-CM

## 2021-02-20 LAB
ALBUMIN SERPL-MCNC: 3.5 G/DL (ref 3.4–5)
ALP SERPL-CCNC: 78 U/L (ref 45–117)
ALT SERPL-CCNC: 19 U/L (ref 12–78)
ANION GAP SERPL CALC-SCNC: 6 MMOL/L (ref 5–15)
BASOPHILS # BLD AUTO: 0.1 X10^3/UL (ref 0–0.1)
BASOPHILS NFR BLD AUTO: 1 % (ref 0–1)
BILIRUB SERPL-MCNC: 0.3 MG/DL (ref 0.2–1)
CALCIUM SERPL-MCNC: 9.1 MG/DL (ref 8.5–10.1)
CHLORIDE SERPL-SCNC: 104 MMOL/L (ref 98–107)
CREAT SERPL-MCNC: 1.94 MG/DL (ref 0.7–1.3)
EOSINOPHIL # BLD AUTO: 0.6 X10^3/UL (ref 0–0.4)
EOSINOPHIL NFR BLD AUTO: 10 % (ref 1–7)
ERYTHROCYTE [DISTWIDTH] IN BLOOD BY AUTOMATED COUNT: 13.8 % (ref 9.4–14.8)
LYMPHOCYTES # BLD AUTO: 1.6 X10^3/UL (ref 1–3.4)
LYMPHOCYTES NFR BLD AUTO: 26 % (ref 22–44)
MCH RBC QN AUTO: 33.4 PG (ref 27.5–34.5)
MCHC RBC AUTO-ENTMCNC: 34.1 G/DL (ref 33.2–36.2)
MD: (no result)
MONOCYTES # BLD AUTO: 0.7 X10^3/UL (ref 0.2–0.8)
MONOCYTES NFR BLD AUTO: 11 % (ref 2–9)
NEUTROPHILS # BLD AUTO: 3.2 X10^3/UL (ref 1.8–6.8)
NEUTROPHILS NFR BLD AUTO: 52 % (ref 42–75)
PLATELET # BLD AUTO: 317 X10^3/UL (ref 130–400)
PMV BLD AUTO: 7.8 FL (ref 7.4–10.4)
PROT SERPL-MCNC: 7.5 G/DL (ref 6.4–8.2)
RBC # BLD AUTO: 3.97 X10^6/UL (ref 4.38–5.82)

## 2021-02-20 PROCEDURE — 80053 COMPREHEN METABOLIC PANEL: CPT

## 2021-02-20 PROCEDURE — 85025 COMPLETE CBC W/AUTO DIFF WBC: CPT

## 2021-02-20 PROCEDURE — 36415 COLL VENOUS BLD VENIPUNCTURE: CPT

## 2021-02-20 PROCEDURE — 99283 EMERGENCY DEPT VISIT LOW MDM: CPT

## 2021-03-16 ENCOUNTER — HOSPITAL ENCOUNTER (EMERGENCY)
Dept: HOSPITAL 8 - ED | Age: 81
Discharge: HOME | End: 2021-03-16
Payer: MEDICARE

## 2021-03-16 VITALS — WEIGHT: 173.06 LBS | BODY MASS INDEX: 25.63 KG/M2 | HEIGHT: 69 IN

## 2021-03-16 VITALS — DIASTOLIC BLOOD PRESSURE: 92 MMHG | SYSTOLIC BLOOD PRESSURE: 152 MMHG

## 2021-03-16 DIAGNOSIS — E11.9: ICD-10-CM

## 2021-03-16 DIAGNOSIS — L23.5: Primary | ICD-10-CM

## 2021-03-16 DIAGNOSIS — I10: ICD-10-CM

## 2021-03-16 PROCEDURE — 82962 GLUCOSE BLOOD TEST: CPT

## 2021-03-16 PROCEDURE — 99283 EMERGENCY DEPT VISIT LOW MDM: CPT

## 2021-03-16 NOTE — NUR
STATES HE NOTICED A RASH THAT STARTED 3 DAYS AGO. LIVING IN THE [facility] NOW. "I 
WAS TAKING MEDICINE FOR ITCHING" THINKS IT MIGHT BE ADDIRAX. TAKES 5 DIFFRENT 
BOTTLES OF MEDICATIONS, NOT SURE WHAT THEY ARE. 

PT STATES HIS ARMS AND TORSO ARE ITCHY. RED RASH NOTED TO ARMS AND TORSO.

## 2021-04-05 ENCOUNTER — HOSPITAL ENCOUNTER (EMERGENCY)
Facility: MEDICAL CENTER | Age: 81
End: 2021-04-05
Attending: EMERGENCY MEDICINE | Admitting: EMERGENCY MEDICINE
Payer: MEDICARE

## 2021-04-05 VITALS
TEMPERATURE: 97.9 F | BODY MASS INDEX: 24.14 KG/M2 | OXYGEN SATURATION: 91 % | HEIGHT: 70 IN | RESPIRATION RATE: 18 BRPM | WEIGHT: 168.65 LBS | SYSTOLIC BLOOD PRESSURE: 159 MMHG | DIASTOLIC BLOOD PRESSURE: 84 MMHG | HEART RATE: 77 BPM

## 2021-04-05 DIAGNOSIS — L30.9 DERMATITIS: Primary | ICD-10-CM

## 2021-04-05 DIAGNOSIS — N18.9 CHRONIC KIDNEY DISEASE, UNSPECIFIED CKD STAGE: ICD-10-CM

## 2021-04-05 DIAGNOSIS — I10 HYPERTENSION, UNSPECIFIED TYPE: ICD-10-CM

## 2021-04-05 LAB
ALBUMIN SERPL BCP-MCNC: 4.2 G/DL (ref 3.2–4.9)
ALBUMIN/GLOB SERPL: 1.1 G/DL
ALP SERPL-CCNC: 82 U/L (ref 30–99)
ALT SERPL-CCNC: 11 U/L (ref 2–50)
ANION GAP SERPL CALC-SCNC: 10 MMOL/L (ref 7–16)
AST SERPL-CCNC: 15 U/L (ref 12–45)
BASOPHILS # BLD AUTO: 0.7 % (ref 0–1.8)
BASOPHILS # BLD: 0.04 K/UL (ref 0–0.12)
BILIRUB SERPL-MCNC: 0.4 MG/DL (ref 0.1–1.5)
BUN SERPL-MCNC: 18 MG/DL (ref 8–22)
CALCIUM SERPL-MCNC: 9.7 MG/DL (ref 8.5–10.5)
CHLORIDE SERPL-SCNC: 100 MMOL/L (ref 96–112)
CO2 SERPL-SCNC: 24 MMOL/L (ref 20–33)
CREAT SERPL-MCNC: 1.84 MG/DL (ref 0.5–1.4)
EOSINOPHIL # BLD AUTO: 0.64 K/UL (ref 0–0.51)
EOSINOPHIL NFR BLD: 10.5 % (ref 0–6.9)
ERYTHROCYTE [DISTWIDTH] IN BLOOD BY AUTOMATED COUNT: 49 FL (ref 35.9–50)
GLOBULIN SER CALC-MCNC: 4 G/DL (ref 1.9–3.5)
GLUCOSE BLD-MCNC: 74 MG/DL (ref 65–99)
GLUCOSE SERPL-MCNC: 97 MG/DL (ref 65–99)
HCT VFR BLD AUTO: 43.3 % (ref 42–52)
HGB BLD-MCNC: 14.3 G/DL (ref 14–18)
IMM GRANULOCYTES # BLD AUTO: 0.02 K/UL (ref 0–0.11)
IMM GRANULOCYTES NFR BLD AUTO: 0.3 % (ref 0–0.9)
LYMPHOCYTES # BLD AUTO: 1.84 K/UL (ref 1–4.8)
LYMPHOCYTES NFR BLD: 30.1 % (ref 22–41)
MCH RBC QN AUTO: 32.8 PG (ref 27–33)
MCHC RBC AUTO-ENTMCNC: 33 G/DL (ref 33.7–35.3)
MCV RBC AUTO: 99.3 FL (ref 81.4–97.8)
MONOCYTES # BLD AUTO: 0.73 K/UL (ref 0–0.85)
MONOCYTES NFR BLD AUTO: 11.9 % (ref 0–13.4)
NEUTROPHILS # BLD AUTO: 2.84 K/UL (ref 1.82–7.42)
NEUTROPHILS NFR BLD: 46.5 % (ref 44–72)
NRBC # BLD AUTO: 0 K/UL
NRBC BLD-RTO: 0 /100 WBC
PLATELET # BLD AUTO: 305 K/UL (ref 164–446)
PMV BLD AUTO: 9.7 FL (ref 9–12.9)
POTASSIUM SERPL-SCNC: 4.1 MMOL/L (ref 3.6–5.5)
PROT SERPL-MCNC: 8.2 G/DL (ref 6–8.2)
RBC # BLD AUTO: 4.36 M/UL (ref 4.7–6.1)
SODIUM SERPL-SCNC: 134 MMOL/L (ref 135–145)
WBC # BLD AUTO: 6.1 K/UL (ref 4.8–10.8)

## 2021-04-05 PROCEDURE — 85025 COMPLETE CBC W/AUTO DIFF WBC: CPT

## 2021-04-05 PROCEDURE — A9270 NON-COVERED ITEM OR SERVICE: HCPCS | Performed by: EMERGENCY MEDICINE

## 2021-04-05 PROCEDURE — 700102 HCHG RX REV CODE 250 W/ 637 OVERRIDE(OP): Performed by: EMERGENCY MEDICINE

## 2021-04-05 PROCEDURE — 80053 COMPREHEN METABOLIC PANEL: CPT

## 2021-04-05 PROCEDURE — 82962 GLUCOSE BLOOD TEST: CPT

## 2021-04-05 PROCEDURE — 99285 EMERGENCY DEPT VISIT HI MDM: CPT

## 2021-04-05 RX ORDER — HYDROXYZINE HYDROCHLORIDE 25 MG/1
25 TABLET, FILM COATED ORAL 3 TIMES DAILY PRN
Qty: 20 TABLET | Refills: 0 | Status: SHIPPED | OUTPATIENT
Start: 2021-04-05

## 2021-04-05 RX ORDER — HYDROXYZINE HYDROCHLORIDE 25 MG/1
12.5 TABLET, FILM COATED ORAL ONCE
Status: COMPLETED | OUTPATIENT
Start: 2021-04-05 | End: 2021-04-05

## 2021-04-05 RX ADMIN — HYDROXYZINE HYDROCHLORIDE 12.5 MG: 25 TABLET, FILM COATED ORAL at 17:49

## 2021-04-05 ASSESSMENT — FIBROSIS 4 INDEX: FIB4 SCORE: 2.17

## 2021-04-05 ASSESSMENT — ENCOUNTER SYMPTOMS
VOMITING: 0
NAUSEA: 0
SORE THROAT: 0
NECK PAIN: 0
FEVER: 0
BACK PAIN: 0
ABDOMINAL PAIN: 0
COUGH: 0
HEADACHES: 0

## 2021-04-05 ASSESSMENT — LIFESTYLE VARIABLES: SUBSTANCE_ABUSE: 0

## 2021-04-06 ENCOUNTER — PATIENT OUTREACH (OUTPATIENT)
Dept: HEALTH INFORMATION MANAGEMENT | Facility: OTHER | Age: 81
End: 2021-04-06

## 2021-04-06 NOTE — ED PROVIDER NOTES
ED Provider Note    Scribed for JAUN Murillo II* by Eli Oh. 4/5/2021  5:19 PM    Means of Arrival: Walk in  History obtained by: patient   Limitations: none    CHIEF COMPLAINT  Chief Complaint   Patient presents with   • Itching     pt c/o generalized itching x months; pt seen here multiple times for same; pt worried abt blood sugar; FSBS 74 in triage       HPI  Ángel Jerry is a 80 y.o. male who presents to the Emergency Department for itching. He locates the itching to his arms and back. He has associated rash.  Rash has been there for several weeks.  He is currently living at the [facility] and has a history of diabetes, hypertension.  Unclear if he is seeing any providers here in the renal area.  Last I saw him in the ER he said he was new to [place], he stated that his doctor is in [place]. . Denies any new foods or possible allergens.  He has not seen any bugs on him.  He does have a history of chronic kidney disease.    REVIEW OF SYSTEMS  Review of Systems   Constitutional: Negative for fever.   HENT: Negative for congestion and sore throat.    Respiratory: Negative for cough.    Cardiovascular: Negative for chest pain.   Gastrointestinal: Negative for abdominal pain, nausea and vomiting.   Musculoskeletal: Negative for back pain and neck pain.   Skin: Positive for itching and rash.   Neurological: Negative for headaches.   Psychiatric/Behavioral: Negative for substance abuse.     See HPI for further details.    PAST MEDICAL HISTORY   has a past medical history of Diabetes (HCC) and Hypertension.    SOCIAL HISTORY  Social History     Tobacco Use   • Smoking status: Never Smoker   • Smokeless tobacco: Never Used   Substance and Sexual Activity   • Alcohol use: Never   • Drug use: Never   • Sexual activity: Not on file       SURGICAL HISTORY  patient denies any surgical history    CURRENT MEDICATIONS  Home Medications    **Home medications have not yet been reviewed for this  "encounter**         ALLERGIES  Allergies   Allergen Reactions   • Penicillins        PHYSICAL EXAM  VITAL SIGNS: /80   Pulse 82   Temp 36.6 °C (97.9 °F) (Temporal)   Resp 18   Ht 1.765 m (5' 9.5\")   Wt 76.5 kg (168 lb 10.4 oz)   SpO2 95%   BMI 24.55 kg/m²     Pulse ox interpretation: I interpret this pulse ox as normal.  Constitutional: Pleasant elderly man.  HENT: No signs of trauma, Bilateral external ears normal, Nose normal.   Eyes: Pupils are equal, Conjunctiva normal, Non-icteric.   Neck: Normal range of motion, No tenderness, Supple, No stridor.   Cardiovascular: Regular rate and rhythm, no murmurs. Symmetric distal pulses. No cyanosis of extremities. No peripheral edema of extremities.  Thorax & Lungs: Normal breath sounds, No respiratory distress, No wheezing, No chest tenderness.   Abdomen: Soft, No tenderness, No masses, No pulsatile masses. No peritoneal signs.  Skin: Warm, Dry, No erythema, No signs of jaundice, small raised skin colored lesions on arms and back.  No blistering.  No petechiae.  Rash is only at upper torso and arms.  No rash at lower extremities.  No rash on hands.  Back: No midline bony tenderness, No CVA tenderness.   Musculoskeletal: Good range of motion in all major joints. No tenderness to palpation or major deformities noted.   Neurologic: Alert , Normal motor function, Normal sensory function, No focal deficits noted.   Psychiatric: Affect normal, Judgment normal, Mood normal.     DIAGNOSTIC STUDIES / PROCEDURES     LABS  Pertinent Labs & Imaging studies reviewed. (See chart for details)    COURSE & MEDICAL DECISION MAKING  Pertinent Labs & Imaging studies reviewed. (See chart for details)    5:19 PM This is a 80 y.o. male who presents with itching and rash and the differential diagnosis includes but is not limited to contact dermatitis, scabies, metabolic disorder, renal failure (rash does not appear consistent with uremia rash but given his known kidney disease I " would like to see if there is significant worsening). Patient is hypertensive in the ED, questionable noncompliance with hypertension medication. Ordered for Accu-chek glucose, CMP, and CBC w/ diff to evaluate. Patient will be treated with atarax 12.5 mg for itching.    8:14 PM  Creatinine 1.84.  BUN normal.  This is a very slight increase from previous labs 3 months ago.  Electrolytes all within acceptable limits.  Blood counts normal.  Unclear the cause of the rashes.  It does appear to be more of a contact dermatitis.  We will prescribe hydroxyzine.  I recommend that he can use Benadryl cream.  He also can try Aquaphor.  I considered trying antiparasitic cream but rash does not appear consistent with scabies.  It is unclear if he has a primary provider here in [place].  He definitely needs one given his chronic kidney disease, elderly age.  I have provided him with information for local primary clinic that he can walk into.  I also asked our community health worker to contact him regarding follow-up.    The patient will return for worsening symptoms and is stable at the time of discharge. The patient verbalizes understanding and will comply. Guidance provided on appropriate use of medications.    FINAL IMPRESSION  1. Dermatitis    2. Chronic kidney disease, unspecified CKD stage    3. Hypertension, unspecified type          I, Eli Oh (Scribe), am scribing for, and in the presence of, CLEVELAND Murillo II.    Electronically signed by: Eli Oh (Chuckibangel), 4/5/2021    ICandido II, M* personally performed the services described in this documentation, as scribed by Eli Oh in my presence, and it is both accurate and complete. C    The note accurately reflects work and decisions made by me.  Candido Wade II, M.D.  4/5/2021  8:18 PM

## 2021-04-06 NOTE — ED NOTES
Discharge teaching and paperwork provided regarding dermatitis and all questions/concerns answered. VSS, intugement assessment stable. Given information regarding Atarax Rx.

## 2021-04-07 NOTE — PROGRESS NOTES
Received voalte from [facility] to schedule PCP appointment for patient. Patient has medicare insurance so can be seen at , [facility],or [facility]. CHW called patient to schedule appointment but numb er on file has been disconnected. CHW called patient';s sister but there was no answer. Telephone machine said unable to complete call as provider is unavailable. CHW will call patient's sister again to schedule PCP appointment.

## 2021-04-08 NOTE — PROGRESS NOTES
CHW called patient again to schedule PCP appointment. Patient's number is disconnected. CHW will have to discharge pt from list due to inability to contact. Patient is welcome to reach out to CHW for assistance.

## 2021-04-28 ENCOUNTER — HOSPITAL ENCOUNTER (EMERGENCY)
Dept: HOSPITAL 8 - ED | Age: 81
Discharge: HOME | End: 2021-04-28
Payer: MEDICARE

## 2021-04-28 VITALS — HEIGHT: 69 IN | WEIGHT: 168.65 LBS | BODY MASS INDEX: 24.98 KG/M2

## 2021-04-28 VITALS — DIASTOLIC BLOOD PRESSURE: 77 MMHG | SYSTOLIC BLOOD PRESSURE: 152 MMHG

## 2021-04-28 DIAGNOSIS — T78.40XA: ICD-10-CM
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
## 2021-04-28 DIAGNOSIS — E11.9: ICD-10-CM

## 2021-04-28 DIAGNOSIS — R21: Primary | ICD-10-CM

## 2021-04-28 DIAGNOSIS — E87.5: ICD-10-CM

## 2021-04-28 DIAGNOSIS — I10: ICD-10-CM

## 2021-04-28 PROCEDURE — 99284 EMERGENCY DEPT VISIT MOD MDM: CPT

## 2021-04-28 NOTE — NUR
CC OF RASH AND ITCHING ON BILAT SIDES OF TORSO. PT SITTING IN CHAIR IN ROOM, NO 
RASH OR ANY DISCOLORATION NOTED TO ANT/POST TORSO.

## 2021-06-20 ENCOUNTER — HOSPITAL ENCOUNTER (EMERGENCY)
Facility: MEDICAL CENTER | Age: 81
End: 2021-06-20
Attending: EMERGENCY MEDICINE
Payer: MEDICARE

## 2021-06-20 VITALS
TEMPERATURE: 97.4 F | DIASTOLIC BLOOD PRESSURE: 88 MMHG | WEIGHT: 165.79 LBS | BODY MASS INDEX: 24.56 KG/M2 | HEIGHT: 69 IN | OXYGEN SATURATION: 100 % | HEART RATE: 64 BPM | RESPIRATION RATE: 16 BRPM | SYSTOLIC BLOOD PRESSURE: 192 MMHG

## 2021-06-20 DIAGNOSIS — I10 HYPERTENSION, UNSPECIFIED TYPE: ICD-10-CM

## 2021-06-20 DIAGNOSIS — N28.9 RENAL INSUFFICIENCY: ICD-10-CM

## 2021-06-20 LAB
ANION GAP SERPL CALC-SCNC: 12 MMOL/L (ref 7–16)
BUN SERPL-MCNC: 14 MG/DL (ref 8–22)
CALCIUM SERPL-MCNC: 9.2 MG/DL (ref 8.5–10.5)
CHLORIDE SERPL-SCNC: 107 MMOL/L (ref 96–112)
CO2 SERPL-SCNC: 22 MMOL/L (ref 20–33)
CREAT SERPL-MCNC: 1.76 MG/DL (ref 0.5–1.4)
GLUCOSE BLD-MCNC: 78 MG/DL (ref 65–99)
GLUCOSE SERPL-MCNC: 89 MG/DL (ref 65–99)
POTASSIUM SERPL-SCNC: 4 MMOL/L (ref 3.6–5.5)
SODIUM SERPL-SCNC: 141 MMOL/L (ref 135–145)

## 2021-06-20 PROCEDURE — 99284 EMERGENCY DEPT VISIT MOD MDM: CPT

## 2021-06-20 PROCEDURE — 82962 GLUCOSE BLOOD TEST: CPT

## 2021-06-20 PROCEDURE — A9270 NON-COVERED ITEM OR SERVICE: HCPCS | Performed by: EMERGENCY MEDICINE

## 2021-06-20 PROCEDURE — 700102 HCHG RX REV CODE 250 W/ 637 OVERRIDE(OP): Performed by: EMERGENCY MEDICINE

## 2021-06-20 PROCEDURE — 80048 BASIC METABOLIC PNL TOTAL CA: CPT

## 2021-06-20 RX ORDER — AMLODIPINE BESYLATE 5 MG/1
5 TABLET ORAL DAILY
Qty: 30 TABLET | Refills: 0 | Status: SHIPPED | OUTPATIENT
Start: 2021-06-20

## 2021-06-20 RX ORDER — AMLODIPINE BESYLATE 5 MG/1
5 TABLET ORAL
Status: DISCONTINUED | OUTPATIENT
Start: 2021-06-20 | End: 2021-06-20 | Stop reason: HOSPADM

## 2021-06-20 RX ADMIN — AMLODIPINE BESYLATE 5 MG: 5 TABLET ORAL at 13:35

## 2021-06-20 ASSESSMENT — FIBROSIS 4 INDEX: FIB4 SCORE: 1.16
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
## 2021-06-20 NOTE — ED NOTES
RN assist:  Ángel Jerry discharged via ambulation with friend.  Discharge instructions given and reviewed, patient educated to follow up with PCP, verbalized understanding.  Prescriptions given x 1, called in electronically for pickup.  All personal belongings in possession.  No questions at this time.

## 2021-06-20 NOTE — DISCHARGE INSTRUCTIONS
Arrange a primary care doctor and office follow-up as soon as possible.  Return here if you need emergency medical care
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
